# Patient Record
Sex: MALE
[De-identification: names, ages, dates, MRNs, and addresses within clinical notes are randomized per-mention and may not be internally consistent; named-entity substitution may affect disease eponyms.]

---

## 2018-01-01 ENCOUNTER — APPOINTMENT (OUTPATIENT)
Dept: RADIATION ONCOLOGY | Facility: CLINIC | Age: 73
End: 2018-01-01
Payer: MEDICARE

## 2018-01-01 ENCOUNTER — EMERGENCY (EMERGENCY)
Facility: HOSPITAL | Age: 73
LOS: 1 days | Discharge: LEFT BEFORE DISCHARGE | End: 2018-01-01
Admitting: EMERGENCY MEDICINE

## 2018-01-01 VITALS
DIASTOLIC BLOOD PRESSURE: 81 MMHG | SYSTOLIC BLOOD PRESSURE: 109 MMHG | OXYGEN SATURATION: 100 % | HEART RATE: 110 BPM | TEMPERATURE: 98 F | RESPIRATION RATE: 16 BRPM

## 2018-01-01 VITALS
RESPIRATION RATE: 18 BRPM | WEIGHT: 161.93 LBS | SYSTOLIC BLOOD PRESSURE: 109 MMHG | DIASTOLIC BLOOD PRESSURE: 69 MMHG | BODY MASS INDEX: 23.91 KG/M2 | HEART RATE: 82 BPM | OXYGEN SATURATION: 100 %

## 2018-01-01 PROCEDURE — 99214 OFFICE O/P EST MOD 30 MIN: CPT

## 2018-01-01 RX ORDER — SACUBITRIL AND VALSARTAN 24; 26 MG/1; MG/1
24-26 TABLET, FILM COATED ORAL
Refills: 0 | Status: DISCONTINUED | COMMUNITY
End: 2018-01-01

## 2018-02-14 ENCOUNTER — APPOINTMENT (OUTPATIENT)
Dept: THORACIC SURGERY | Facility: CLINIC | Age: 73
End: 2018-02-14
Payer: MEDICARE

## 2018-02-14 VITALS
HEIGHT: 69 IN | WEIGHT: 164 LBS | HEART RATE: 75 BPM | SYSTOLIC BLOOD PRESSURE: 98 MMHG | OXYGEN SATURATION: 96 % | DIASTOLIC BLOOD PRESSURE: 68 MMHG | BODY MASS INDEX: 24.29 KG/M2 | RESPIRATION RATE: 16 BRPM

## 2018-02-14 DIAGNOSIS — F17.200 NICOTINE DEPENDENCE, UNSPECIFIED, UNCOMPLICATED: ICD-10-CM

## 2018-02-14 DIAGNOSIS — Z87.891 PERSONAL HISTORY OF NICOTINE DEPENDENCE: ICD-10-CM

## 2018-02-14 DIAGNOSIS — Z82.49 FAMILY HISTORY OF ISCHEMIC HEART DISEASE AND OTHER DISEASES OF THE CIRCULATORY SYSTEM: ICD-10-CM

## 2018-02-14 PROCEDURE — 99205 OFFICE O/P NEW HI 60 MIN: CPT

## 2018-02-15 ENCOUNTER — CHART COPY (OUTPATIENT)
Age: 73
End: 2018-02-15

## 2018-02-15 PROBLEM — Z82.49 FAMILY HISTORY OF CARDIAC DISORDER: Status: ACTIVE | Noted: 2018-02-15

## 2018-02-16 ENCOUNTER — CHART COPY (OUTPATIENT)
Age: 73
End: 2018-02-16

## 2018-02-21 ENCOUNTER — APPOINTMENT (OUTPATIENT)
Dept: PULMONOLOGY | Facility: CLINIC | Age: 73
End: 2018-02-21

## 2018-02-21 ENCOUNTER — APPOINTMENT (OUTPATIENT)
Dept: PULMONOLOGY | Facility: CLINIC | Age: 73
End: 2018-02-21
Payer: MEDICARE

## 2018-02-21 PROCEDURE — 94726 PLETHYSMOGRAPHY LUNG VOLUMES: CPT

## 2018-02-21 PROCEDURE — 94729 DIFFUSING CAPACITY: CPT

## 2018-02-21 PROCEDURE — 94010 BREATHING CAPACITY TEST: CPT

## 2018-02-26 ENCOUNTER — FORM ENCOUNTER (OUTPATIENT)
Age: 73
End: 2018-02-26

## 2018-02-27 ENCOUNTER — APPOINTMENT (OUTPATIENT)
Dept: NUCLEAR MEDICINE | Facility: IMAGING CENTER | Age: 73
End: 2018-02-27
Payer: MEDICARE

## 2018-02-27 ENCOUNTER — OUTPATIENT (OUTPATIENT)
Dept: OUTPATIENT SERVICES | Facility: HOSPITAL | Age: 73
LOS: 1 days | End: 2018-02-27
Payer: MEDICARE

## 2018-02-27 DIAGNOSIS — I42.9 CARDIOMYOPATHY, UNSPECIFIED: ICD-10-CM

## 2018-02-27 PROCEDURE — 78815 PET IMAGE W/CT SKULL-THIGH: CPT

## 2018-02-27 PROCEDURE — A9552: CPT

## 2018-02-27 PROCEDURE — 78815 PET IMAGE W/CT SKULL-THIGH: CPT | Mod: 26,PI

## 2018-03-07 ENCOUNTER — APPOINTMENT (OUTPATIENT)
Dept: THORACIC SURGERY | Facility: CLINIC | Age: 73
End: 2018-03-07
Payer: MEDICARE

## 2018-03-12 ENCOUNTER — APPOINTMENT (OUTPATIENT)
Dept: THORACIC SURGERY | Facility: CLINIC | Age: 73
End: 2018-03-12
Payer: MEDICARE

## 2018-03-12 PROCEDURE — 99214 OFFICE O/P EST MOD 30 MIN: CPT

## 2018-03-16 ENCOUNTER — OUTPATIENT (OUTPATIENT)
Dept: OUTPATIENT SERVICES | Facility: HOSPITAL | Age: 73
LOS: 1 days | Discharge: HOME | End: 2018-03-16

## 2018-03-20 ENCOUNTER — APPOINTMENT (OUTPATIENT)
Dept: RADIATION ONCOLOGY | Facility: CLINIC | Age: 73
End: 2018-03-20
Payer: MEDICARE

## 2018-03-20 VITALS
WEIGHT: 158.29 LBS | OXYGEN SATURATION: 97 % | RESPIRATION RATE: 18 BRPM | HEIGHT: 69 IN | HEART RATE: 83 BPM | SYSTOLIC BLOOD PRESSURE: 90 MMHG | BODY MASS INDEX: 23.44 KG/M2 | DIASTOLIC BLOOD PRESSURE: 62 MMHG

## 2018-03-20 DIAGNOSIS — Z95.0 PRESENCE OF CARDIAC PACEMAKER: ICD-10-CM

## 2018-03-20 DIAGNOSIS — Z80.52 FAMILY HISTORY OF MALIGNANT NEOPLASM OF BLADDER: ICD-10-CM

## 2018-03-20 PROCEDURE — 99205 OFFICE O/P NEW HI 60 MIN: CPT | Mod: 25,GC

## 2018-04-12 ENCOUNTER — TRANSCRIPTION ENCOUNTER (OUTPATIENT)
Age: 73
End: 2018-04-12

## 2018-06-20 ENCOUNTER — APPOINTMENT (OUTPATIENT)
Dept: RADIATION ONCOLOGY | Facility: CLINIC | Age: 73
End: 2018-06-20

## 2018-08-27 NOTE — ED ADULT TRIAGE NOTE - CHIEF COMPLAINT QUOTE
pt. poor historian states he is having urinary retention. Pt. states he has a Frey in place and does not remember the last time he urinated. Pt. denies any pain at this moment. pt. poor historian states he is having urinary retention. As per daughter pt. has a suprapubic catheter placed in June. Daughter states earlier today at the urologist changed the tubing , since then , urine output has been none. Pt. denies any pain at this moment.

## 2018-08-27 NOTE — ED ADULT NURSE NOTE - CHIEF COMPLAINT QUOTE
pt. poor historian states he is having urinary retention. As per daughter pt. has a suprapubic catheter placed in June. Daughter states earlier today at the urologist changed the tubing , since then , urine output has been none. Pt. denies any pain at this moment.

## 2018-11-21 NOTE — VITALS
[Maximal Pain Intensity: 0/10] : 0/10 [Least Pain Intensity: 0/10] : 0/10 [70: Cares for self; unalbe to carry on normal activity or do active work.] : 70: Cares for self; unable to carry on normal activity or do active work. [ECOG Performance Status: 2 - Ambulatory and capable of all self care but unable to carry out any work activities] : Performance Status: 2 - Ambulatory and capable of all self care but unable to carry out any work activities. Up and about more than 50% of waking hours

## 2019-01-01 ENCOUNTER — APPOINTMENT (OUTPATIENT)
Dept: RADIATION ONCOLOGY | Facility: CLINIC | Age: 74
End: 2019-01-01
Payer: MEDICARE

## 2019-01-01 ENCOUNTER — TRANSCRIPTION ENCOUNTER (OUTPATIENT)
Age: 74
End: 2019-01-01

## 2019-01-01 ENCOUNTER — APPOINTMENT (OUTPATIENT)
Dept: UROLOGY | Facility: CLINIC | Age: 74
End: 2019-01-01

## 2019-01-01 VITALS
OXYGEN SATURATION: 98 % | SYSTOLIC BLOOD PRESSURE: 125 MMHG | BODY MASS INDEX: 22.63 KG/M2 | TEMPERATURE: 36.5 F | HEART RATE: 85 BPM | RESPIRATION RATE: 18 BRPM | WEIGHT: 153.22 LBS | DIASTOLIC BLOOD PRESSURE: 74 MMHG

## 2019-01-01 DIAGNOSIS — R91.1 SOLITARY PULMONARY NODULE: ICD-10-CM

## 2019-01-01 PROCEDURE — 99214 OFFICE O/P EST MOD 30 MIN: CPT

## 2019-03-20 PROBLEM — R91.1 LUNG NODULE: Status: ACTIVE | Noted: 2018-02-14

## 2019-03-20 NOTE — HISTORY OF PRESENT ILLNESS
[FreeTextEntry1] : Mr. Paz is a 73 year old gentleman with a recently identified mildly FDG-avid 9 mm left lung nodule. He was not a candidate for biopsy or surgical intervention per evaluation with thoracic surgery. The lesion was felt to be most likely malignant based on current imaging characteristics, however, since it was the first time the lesion had been noted and it was small with only mild FDG avidity, when I saw him for initial consultation in March 2018 I preferred to repeat a CT in 2 months to determine if the lesion truly warranted intervention.  If the lesion had not resolved and continued to have concerning imaging characteristics, then we would consider SBRT for local control under a radiographic diagnosis, for this inoperable lesion.   Then CT in May 2018 showed stable 8mm FLORENTINO nodule.  He was then in [place] on and off for pneumonia and for UTI and absess in scrotum along with mental status change.  CT in august 2018 showed resolved bilateral pleural effusions and improving atelectasis.  He now returns for follow-up.\par \par Currently, he denies  sob and coughing at this time-oxygen sat level 100% on RA.  Denies dysphagia and he is eating well.  He has a suprapubic catheter which was placed 6/16/18 and is draining well.

## 2019-03-20 NOTE — REVIEW OF SYSTEMS
[Fatigue] : fatigue [Disturbance Of Gait] : gait disturbance [Confused] : confusion [Difficulty Walking] : difficulty walking [Negative] : Allergic/Immunologic [FreeTextEntry9] : unbalanced at times [FreeTextEntry8] : suprapubic caths

## 2019-03-20 NOTE — PHYSICAL EXAM
[Normal] : oriented to person, place and time, the affect was normal, the mood was normal and not anxious [Sclera] : the sclera and conjunctiva were normal

## 2019-05-14 NOTE — REVIEW OF SYSTEMS
[Negative] : Gastrointestinal [Fatigue: Grade 0] : Fatigue: Grade 0 [Dyspnea: Grade 0] : Dyspnea: Grade 0 [Cough: Grade 0] : Cough: Grade 0 [Hoarseness: Grade 0] : Hoarseness: Grade 0 [Hypoxia: Grade 0] : Hypoxia: Grade 0 [Shortness Of Breath] : no shortness of breath [Wheezing] : no wheezing [Cough] : no cough [SOB on Exertion] : no shortness of breath during exertion

## 2019-05-14 NOTE — HISTORY OF PRESENT ILLNESS
[FreeTextEntry1] : Mr. Paz is a 73 year old gentleman with a history of a mildly FDG-avid 9 mm left lung nodule identified in the Spring of 2018. He was not a candidate for biopsy or surgical intervention per evaluation with thoracic surgery. The lesion was felt to be most likely malignant based on imaging characteristics, however, since it was the first time the lesion had been noted and it was small with only mild FDG avidity, when I saw him for initial consultation in March 2018 I preferred to repeat a CT in 2 months to determine if the lesion truly warranted intervention. If the lesion had not resolved and continued to have concerning imaging characteristics, then we would consider SBRT for local control under a radiographic diagnosis, for this inoperable lesion. A CT in May 2018 showed stable 8mm FLORENTINO nodule, but he was then in [place] on and off for pneumonia and for UTI and abscess in scrotum along with mental status change. CT in June reportedly showed the lesion had decreased in size to 5mm, although unclear if due to technique.  Then a CT in august 2018 showed resolved bilateral pleural effusions and improving atelectasis. I then saw him for f/u in Nov. 21 and recommended a f/u CT to re-evaluate the nodule since his clinical status had otherwise then normalized.  He now returns for follow-up.\par \par On 3/11/19 CT chest showed a 1.4 cm lung nodule in the FLORENTINO that has increased in size compared to June 2018 scan.  No other new findings identified.  Today he denies worsening shortness of breast, cough, wheezing and chest pain. He has gained some weight and his appetite is better.  Continued to have urinary catheter and will f/u with Urology in April 2019.  Otherwise he continues to do well.\par